# Patient Record
Sex: FEMALE | Race: WHITE | Employment: PART TIME | URBAN - METROPOLITAN AREA
[De-identification: names, ages, dates, MRNs, and addresses within clinical notes are randomized per-mention and may not be internally consistent; named-entity substitution may affect disease eponyms.]

---

## 2017-03-22 ENCOUNTER — TELEPHONE (OUTPATIENT)
Dept: UROLOGY | Facility: HOSPITAL | Age: 72
End: 2017-03-22

## 2017-03-22 NOTE — TELEPHONE ENCOUNTER
Called patient, history obtained, c/o bulge in vagina, reports leaking with activity and on the way to the bathroom, denies any constipation, recent UTI's, or pain, wears heavy pads and changes 1-2 per day, has tried Kegals, no PT or any oral meds, not int

## 2017-03-30 ENCOUNTER — OFFICE VISIT (OUTPATIENT)
Dept: UROLOGY | Facility: HOSPITAL | Age: 72
End: 2017-03-30
Attending: OBSTETRICS & GYNECOLOGY
Payer: MEDICARE

## 2017-03-30 ENCOUNTER — TELEPHONE (OUTPATIENT)
Dept: UROLOGY | Facility: HOSPITAL | Age: 72
End: 2017-03-30

## 2017-03-30 VITALS
HEIGHT: 64.5 IN | BODY MASS INDEX: 23.27 KG/M2 | SYSTOLIC BLOOD PRESSURE: 122 MMHG | DIASTOLIC BLOOD PRESSURE: 82 MMHG | WEIGHT: 138 LBS

## 2017-03-30 DIAGNOSIS — N95.2 POSTMENOPAUSAL ATROPHIC VAGINITIS: ICD-10-CM

## 2017-03-30 DIAGNOSIS — N95.0 POST-MENOPAUSAL BLEEDING: ICD-10-CM

## 2017-03-30 DIAGNOSIS — N81.2 UTEROVAGINAL PROLAPSE, INCOMPLETE: Primary | ICD-10-CM

## 2017-03-30 PROCEDURE — 99201 HC OUTPT EVAL AND MGNT NEW PT LEVEL 1: CPT

## 2017-03-30 RX ORDER — ESTRADIOL 0.1 MG/G
CREAM VAGINAL
Qty: 42.5 G | Refills: 3 | Status: SHIPPED | OUTPATIENT
Start: 2017-03-30

## 2017-03-30 NOTE — PATIENT INSTRUCTIONS
Each year, thousands of Americans fall at home. Many of them are seriously injured, and some are disabled. All age groups are affected, with adults over age 61 ranking highest for these injuries.     The points below address hazards found in your home 2.  Squeeze out enough cream from the tube to cover 1/2 of your index finger. (See figure 1)    3. Locate the vaginal opening (See figure 2). Immediately above the vagina is the urethra (a small opening where urine is eliminated from your body).   The ur

## 2017-04-05 ENCOUNTER — TELEPHONE (OUTPATIENT)
Dept: UROLOGY | Facility: HOSPITAL | Age: 72
End: 2017-04-05

## 2017-04-05 DIAGNOSIS — N95.0 POST-MENOPAUSAL BLEEDING: Primary | ICD-10-CM

## 2017-04-06 ENCOUNTER — HOSPITAL ENCOUNTER (OUTPATIENT)
Dept: ULTRASOUND IMAGING | Facility: HOSPITAL | Age: 72
Discharge: HOME | End: 2017-04-06
Attending: OBSTETRICS & GYNECOLOGY
Payer: MEDICARE

## 2017-04-06 DIAGNOSIS — N95.0 POST-MENOPAUSAL BLEEDING: ICD-10-CM

## 2017-04-06 PROCEDURE — 76856 US EXAM PELVIC COMPLETE: CPT

## 2017-04-06 PROCEDURE — 76830 TRANSVAGINAL US NON-OB: CPT

## 2017-04-24 ENCOUNTER — TELEPHONE (OUTPATIENT)
Dept: UROLOGY | Facility: HOSPITAL | Age: 72
End: 2017-04-24

## 2017-04-24 NOTE — TELEPHONE ENCOUNTER
Pt phoned back after she LM on our VM Pt was worried about the warning on the label of the estrace cream about blood clots.  Reassured pt that this medication is not a systemic medication and majority of it will stay in the vagina, meaning she is at a low r

## 2017-04-26 ENCOUNTER — TELEPHONE (OUTPATIENT)
Dept: UROLOGY | Facility: HOSPITAL | Age: 72
End: 2017-04-26

## 2017-04-26 DIAGNOSIS — N32.81 OAB (OVERACTIVE BLADDER): ICD-10-CM

## 2017-04-26 DIAGNOSIS — N95.2 POSTMENOPAUSAL ATROPHIC VAGINITIS: ICD-10-CM

## 2017-04-26 DIAGNOSIS — N39.3 SUI (STRESS URINARY INCONTINENCE, FEMALE): Primary | ICD-10-CM

## 2017-04-26 DIAGNOSIS — N81.9 PROLAPSE OF FEMALE GENITAL ORGANS: ICD-10-CM

## 2017-04-26 NOTE — TELEPHONE ENCOUNTER
Affter speaking to Dr Keenan Lester today, he agreed pt could try PT. Pt called back and informed of this. Pt verbalized an understanding. She would like to go to [facility] in [place]. Orders faxed.

## 2017-06-09 ENCOUNTER — OFFICE VISIT (OUTPATIENT)
Dept: UROLOGY | Facility: HOSPITAL | Age: 72
End: 2017-06-09
Attending: OBSTETRICS & GYNECOLOGY
Payer: MEDICARE

## 2017-06-09 VITALS
HEIGHT: 64.5 IN | DIASTOLIC BLOOD PRESSURE: 60 MMHG | BODY MASS INDEX: 23.27 KG/M2 | WEIGHT: 138 LBS | SYSTOLIC BLOOD PRESSURE: 100 MMHG

## 2017-06-09 DIAGNOSIS — N81.9 PROLAPSE OF FEMALE GENITAL ORGANS: ICD-10-CM

## 2017-06-09 DIAGNOSIS — N95.2 POSTMENOPAUSAL ATROPHIC VAGINITIS: ICD-10-CM

## 2017-06-09 DIAGNOSIS — N39.3 SUI (STRESS URINARY INCONTINENCE, FEMALE): Primary | ICD-10-CM

## 2017-06-09 DIAGNOSIS — N32.81 OAB (OVERACTIVE BLADDER): ICD-10-CM

## 2017-06-09 PROCEDURE — 99211 OFF/OP EST MAY X REQ PHY/QHP: CPT

## 2017-06-09 PROCEDURE — 57160 INSERT PESSARY/OTHER DEVICE: CPT

## 2017-06-22 ENCOUNTER — OFFICE VISIT (OUTPATIENT)
Dept: UROLOGY | Facility: HOSPITAL | Age: 72
End: 2017-06-22
Attending: OBSTETRICS & GYNECOLOGY
Payer: MEDICARE

## 2017-06-22 VITALS
DIASTOLIC BLOOD PRESSURE: 50 MMHG | WEIGHT: 138 LBS | HEIGHT: 64.5 IN | BODY MASS INDEX: 23.27 KG/M2 | SYSTOLIC BLOOD PRESSURE: 90 MMHG

## 2017-06-22 DIAGNOSIS — N95.2 POSTMENOPAUSAL ATROPHIC VAGINITIS: ICD-10-CM

## 2017-06-22 DIAGNOSIS — N39.3 SUI (STRESS URINARY INCONTINENCE, FEMALE): Primary | ICD-10-CM

## 2017-06-22 DIAGNOSIS — N81.2 UTEROVAGINAL PROLAPSE, INCOMPLETE: ICD-10-CM

## 2017-06-22 PROCEDURE — 99211 OFF/OP EST MAY X REQ PHY/QHP: CPT

## 2017-06-22 NOTE — PROCEDURES
.Patient here for pessary check, denies any c/o leakage, vaginal spotting/discharge. Patient happy with pessary. #5 incontinence dish  pessary removed and cleaned. Speculum exam revealed pink moist tissue, no open areas or lesions noted.   Lavaged with ha

## 2017-08-03 ENCOUNTER — TELEPHONE (OUTPATIENT)
Dept: UROLOGY | Facility: HOSPITAL | Age: 72
End: 2017-08-03

## 2017-08-03 NOTE — TELEPHONE ENCOUNTER
Patient question if she can wear pessary on while traveling on an airplane - Reassurance provided that she can continue pessary use with air travel

## 2017-12-08 ENCOUNTER — OFFICE VISIT (OUTPATIENT)
Dept: UROLOGY | Facility: HOSPITAL | Age: 72
End: 2017-12-08
Attending: OBSTETRICS & GYNECOLOGY
Payer: MEDICARE

## 2017-12-08 VITALS
BODY MASS INDEX: 23.27 KG/M2 | DIASTOLIC BLOOD PRESSURE: 68 MMHG | SYSTOLIC BLOOD PRESSURE: 104 MMHG | WEIGHT: 138 LBS | HEIGHT: 64.5 IN

## 2017-12-08 DIAGNOSIS — N81.11 CYSTOCELE, MIDLINE: ICD-10-CM

## 2017-12-08 DIAGNOSIS — N81.2 UTEROVAGINAL PROLAPSE, INCOMPLETE: Primary | ICD-10-CM

## 2017-12-08 DIAGNOSIS — N95.2 POSTMENOPAUSAL ATROPHIC VAGINITIS: ICD-10-CM

## 2017-12-08 DIAGNOSIS — N81.6 RECTOCELE: ICD-10-CM

## 2017-12-08 PROCEDURE — 99211 OFF/OP EST MAY X REQ PHY/QHP: CPT

## 2018-05-10 ENCOUNTER — TELEPHONE (OUTPATIENT)
Dept: UROLOGY | Facility: HOSPITAL | Age: 73
End: 2018-05-10

## 2018-05-10 NOTE — TELEPHONE ENCOUNTER
Requesting refill on Estrace [facility]  - using compounding - self manages pessary without a problem - will follow up with Dr Conteh Co 12/2018  Refill processed to compounding pharmacy

## 2018-12-04 PROCEDURE — 87086 URINE CULTURE/COLONY COUNT: CPT | Performed by: INTERNAL MEDICINE

## 2018-12-04 PROCEDURE — 81001 URINALYSIS AUTO W/SCOPE: CPT | Performed by: INTERNAL MEDICINE

## 2018-12-14 ENCOUNTER — OFFICE VISIT (OUTPATIENT)
Dept: UROLOGY | Facility: HOSPITAL | Age: 73
End: 2018-12-14
Attending: OPHTHALMOLOGY
Payer: MEDICARE

## 2018-12-14 VITALS
SYSTOLIC BLOOD PRESSURE: 98 MMHG | DIASTOLIC BLOOD PRESSURE: 62 MMHG | BODY MASS INDEX: 23.27 KG/M2 | WEIGHT: 138 LBS | HEIGHT: 64.5 IN

## 2018-12-14 DIAGNOSIS — N81.2 UTEROVAGINAL PROLAPSE, INCOMPLETE: Primary | ICD-10-CM

## 2018-12-14 DIAGNOSIS — N95.2 POSTMENOPAUSAL ATROPHIC VAGINITIS: ICD-10-CM

## 2018-12-14 PROCEDURE — 99211 OFF/OP EST MAY X REQ PHY/QHP: CPT

## 2019-09-24 ENCOUNTER — TELEPHONE (OUTPATIENT)
Dept: UROLOGY | Facility: HOSPITAL | Age: 74
End: 2019-09-24

## 2019-09-24 NOTE — TELEPHONE ENCOUNTER
Pt calling asking if she still needs her estrace cream. Phoned pt back, LM on VM saying pt still needed estrace cream. Call if she needs a refill.

## 2019-09-25 ENCOUNTER — TELEPHONE (OUTPATIENT)
Dept: UROLOGY | Facility: HOSPITAL | Age: 74
End: 2019-09-25

## 2019-11-18 ENCOUNTER — OFFICE VISIT (OUTPATIENT)
Dept: UROLOGY | Facility: HOSPITAL | Age: 74
End: 2019-11-18
Attending: OBSTETRICS & GYNECOLOGY
Payer: MEDICARE

## 2019-11-18 VITALS
WEIGHT: 138 LBS | HEIGHT: 64.5 IN | SYSTOLIC BLOOD PRESSURE: 116 MMHG | BODY MASS INDEX: 23.27 KG/M2 | DIASTOLIC BLOOD PRESSURE: 80 MMHG

## 2019-11-18 DIAGNOSIS — N81.11 CYSTOCELE, MIDLINE: ICD-10-CM

## 2019-11-18 DIAGNOSIS — N95.2 POSTMENOPAUSAL ATROPHIC VAGINITIS: ICD-10-CM

## 2019-11-18 DIAGNOSIS — N81.2 UTEROVAGINAL PROLAPSE, INCOMPLETE: Primary | ICD-10-CM

## 2019-11-18 DIAGNOSIS — N81.6 RECTOCELE: ICD-10-CM

## 2019-11-18 PROCEDURE — 99212 OFFICE O/P EST SF 10 MIN: CPT

## 2021-03-08 ENCOUNTER — TELEPHONE (OUTPATIENT)
Dept: UROLOGY | Facility: HOSPITAL | Age: 76
End: 2021-03-08

## 2021-03-08 NOTE — TELEPHONE ENCOUNTER
Patient called stating she received Estradiol capsules from [address] [place] [address] (ordered 2/22/20210 does not feel comfortable placing the capsules would like her previous used cream instead. Geovanna Gutierrez at [address] [place] [address] to have them send her Estradiol.4mg/gm 3 times a week.  Dispe

## 2021-05-03 ENCOUNTER — OFFICE VISIT (OUTPATIENT)
Dept: UROLOGY | Facility: HOSPITAL | Age: 76
End: 2021-05-03
Attending: OBSTETRICS & GYNECOLOGY
Payer: MEDICARE

## 2021-05-03 VITALS — TEMPERATURE: 98 F | BODY MASS INDEX: 23.27 KG/M2 | HEIGHT: 64.5 IN | WEIGHT: 138 LBS

## 2021-05-03 DIAGNOSIS — N81.2 UTEROVAGINAL PROLAPSE, INCOMPLETE: Primary | ICD-10-CM

## 2021-05-03 DIAGNOSIS — N95.2 POSTMENOPAUSAL ATROPHIC VAGINITIS: ICD-10-CM

## 2021-05-03 DIAGNOSIS — N81.6 RECTOCELE: ICD-10-CM

## 2021-05-03 DIAGNOSIS — N81.11 CYSTOCELE, MIDLINE: ICD-10-CM

## 2021-05-03 PROCEDURE — 99212 OFFICE O/P EST SF 10 MIN: CPT

## 2021-06-16 ENCOUNTER — TELEPHONE (OUTPATIENT)
Dept: UROLOGY | Facility: HOSPITAL | Age: 76
End: 2021-06-16

## 2021-06-16 NOTE — TELEPHONE ENCOUNTER
Pt calling today asking if the estrace would cause varicose veins. Pt read estradiol insert and feels it may have contributed. Discussed eran estrogen vs vaginal estrogen. Safe to use in vagina. Pt will go back to using.

## 2021-12-21 ENCOUNTER — TELEPHONE (OUTPATIENT)
Dept: UROLOGY | Facility: HOSPITAL | Age: 76
End: 2021-12-21

## 2021-12-21 NOTE — TELEPHONE ENCOUNTER
Pt calling for refill of estradiol. Gets filled through [facility]. Pt will call them for a refill.

## 2022-05-13 ENCOUNTER — OFFICE VISIT (OUTPATIENT)
Dept: UROLOGY | Facility: CLINIC | Age: 77
End: 2022-05-13
Attending: OBSTETRICS & GYNECOLOGY
Payer: MEDICARE

## 2022-05-13 VITALS — BODY MASS INDEX: 24.15 KG/M2 | WEIGHT: 138 LBS | TEMPERATURE: 98 F | HEIGHT: 63.5 IN

## 2022-05-13 DIAGNOSIS — N81.11 CYSTOCELE, MIDLINE: ICD-10-CM

## 2022-05-13 DIAGNOSIS — N95.2 POSTMENOPAUSAL ATROPHIC VAGINITIS: ICD-10-CM

## 2022-05-13 DIAGNOSIS — N81.2 UTEROVAGINAL PROLAPSE, INCOMPLETE: Primary | ICD-10-CM

## 2022-05-13 PROCEDURE — 99212 OFFICE O/P EST SF 10 MIN: CPT

## 2022-07-01 ENCOUNTER — TELEPHONE (OUTPATIENT)
Dept: UROLOGY | Facility: CLINIC | Age: 77
End: 2022-07-01

## 2022-07-01 DIAGNOSIS — N95.2 POSTMENOPAUSAL ATROPHIC VAGINITIS: Primary | ICD-10-CM

## 2022-07-01 RX ORDER — ESTRADIOL 0.1 MG/G
CREAM VAGINAL
Qty: 42.5 G | Refills: 3 | Status: SHIPPED | OUTPATIENT
Start: 2022-07-01

## 2022-07-01 NOTE — TELEPHONE ENCOUNTER
Patient last visit 6/17/4938 per office policy refilled Estradiol cream patient has used [facility]IP would like to try Paola Prater (given # over phone) ordered through [address] [place] [address] for $22.44 per website. Patient will call with any questions.

## 2023-04-10 ENCOUNTER — TELEPHONE (OUTPATIENT)
Dept: UROLOGY | Facility: CLINIC | Age: 78
End: 2023-04-10

## 2023-04-10 DIAGNOSIS — N95.2 POSTMENOPAUSAL ATROPHIC VAGINITIS: ICD-10-CM

## 2023-04-10 RX ORDER — ESTRADIOL 0.1 MG/G
CREAM VAGINAL
Qty: 42.5 G | Refills: 3 | Status: SHIPPED | OUTPATIENT
Start: 2023-04-10

## 2023-04-10 NOTE — TELEPHONE ENCOUNTER
Patient called requesting RX refill for Estrace cream. Patient has follow up appointment scheduled with Dr. Garret Betancur on 5/15/23.  RX will be

## 2023-05-10 ENCOUNTER — TELEPHONE (OUTPATIENT)
Dept: UROLOGY | Facility: CLINIC | Age: 78
End: 2023-05-10

## 2023-05-15 ENCOUNTER — OFFICE VISIT (OUTPATIENT)
Dept: UROLOGY | Facility: CLINIC | Age: 78
End: 2023-05-15
Attending: OBSTETRICS & GYNECOLOGY
Payer: MEDICARE

## 2023-05-15 VITALS — BODY MASS INDEX: 23.27 KG/M2 | HEIGHT: 64.5 IN | WEIGHT: 138 LBS | TEMPERATURE: 98 F

## 2023-05-15 DIAGNOSIS — N39.3 FEMALE STRESS INCONTINENCE: ICD-10-CM

## 2023-05-15 DIAGNOSIS — N81.11 CYSTOCELE, MIDLINE: ICD-10-CM

## 2023-05-15 DIAGNOSIS — N81.6 RECTOCELE: ICD-10-CM

## 2023-05-15 DIAGNOSIS — N81.2 UTEROVAGINAL PROLAPSE, INCOMPLETE: Primary | ICD-10-CM

## 2023-05-15 DIAGNOSIS — N95.2 POSTMENOPAUSAL ATROPHIC VAGINITIS: ICD-10-CM

## 2023-05-15 PROCEDURE — 99212 OFFICE O/P EST SF 10 MIN: CPT

## 2024-05-06 ENCOUNTER — TELEPHONE (OUTPATIENT)
Dept: UROLOGY | Facility: CLINIC | Age: 79
End: 2024-05-06

## 2024-05-13 ENCOUNTER — OFFICE VISIT (OUTPATIENT)
Dept: UROLOGY | Facility: CLINIC | Age: 79
End: 2024-05-13
Attending: OBSTETRICS & GYNECOLOGY
Payer: MEDICARE

## 2024-05-13 VITALS
TEMPERATURE: 98 F | DIASTOLIC BLOOD PRESSURE: 68 MMHG | WEIGHT: 138 LBS | BODY MASS INDEX: 24.45 KG/M2 | SYSTOLIC BLOOD PRESSURE: 112 MMHG | HEIGHT: 63 IN | RESPIRATION RATE: 17 BRPM

## 2024-05-13 DIAGNOSIS — N81.6 RECTOCELE: ICD-10-CM

## 2024-05-13 DIAGNOSIS — N95.2 POSTMENOPAUSAL ATROPHIC VAGINITIS: ICD-10-CM

## 2024-05-13 DIAGNOSIS — N81.11 CYSTOCELE, MIDLINE: ICD-10-CM

## 2024-05-13 DIAGNOSIS — N81.2 UTEROVAGINAL PROLAPSE, INCOMPLETE: Primary | ICD-10-CM

## 2024-05-13 DIAGNOSIS — N39.3 FEMALE STRESS INCONTINENCE: ICD-10-CM

## 2024-05-13 PROCEDURE — 99212 OFFICE O/P EST SF 10 MIN: CPT

## 2024-05-13 RX ORDER — ESTRADIOL 0.1 MG/G
CREAM VAGINAL
Qty: 42.5 G | Refills: 3 | Status: SHIPPED | OUTPATIENT
Start: 2024-05-13

## 2024-07-09 ENCOUNTER — TELEPHONE (OUTPATIENT)
Dept: UROLOGY | Facility: CLINIC | Age: 79
End: 2024-07-09

## 2024-07-09 NOTE — TELEPHONE ENCOUNTER
TC from pt saying Dr Pulliam fit her w/ new # 5 incont dish 5/13/24. Is having dexterity issues getting pessary out, making herself bleed at introitus. She feels last pessary was softer and easier to get in and out.   Pt reports SHILA isn't as bother some as the bulge. Feels a regular ring w/ support would be easier.   Plan to have pt come in next week for pessary fitting w/ PA.

## 2024-07-17 ENCOUNTER — OFFICE VISIT (OUTPATIENT)
Dept: UROLOGY | Facility: CLINIC | Age: 79
End: 2024-07-17
Attending: OBSTETRICS & GYNECOLOGY
Payer: MEDICARE

## 2024-07-17 VITALS — BODY MASS INDEX: 24 KG/M2 | RESPIRATION RATE: 18 BRPM | TEMPERATURE: 98 F | HEIGHT: 63 IN

## 2024-07-17 DIAGNOSIS — N81.2 UTEROVAGINAL PROLAPSE, INCOMPLETE: Primary | ICD-10-CM

## 2024-07-17 DIAGNOSIS — N39.3 FEMALE STRESS INCONTINENCE: ICD-10-CM

## 2024-07-17 DIAGNOSIS — N81.84 PELVIC MUSCLE WASTING: ICD-10-CM

## 2024-07-17 DIAGNOSIS — N95.2 POSTMENOPAUSAL ATROPHIC VAGINITIS: ICD-10-CM

## 2024-07-17 PROCEDURE — 57160 INSERT PESSARY/OTHER DEVICE: CPT

## 2024-07-17 PROCEDURE — 99212 OFFICE O/P EST SF 10 MIN: CPT

## 2024-07-17 NOTE — PROGRESS NOTES
Pt presents for f/u of pelvic organ prolapse, pessary care  Using #5 large knob incontinence dish, home care    Reports pessary is comfortable but she's having difficulty inserting/removing and would like to maintain home care    Denies discharge  Reports bleeding with insertion  Denies s/sx of UTI  Bowels reg  Pt is removing pessary at home q 2 weeks  Pt is using vaginal estrogen cream 3x weekly    Would like to try ring with support pessary  She is not currently interested in surgical management of her pelvic organ prolapse    Urogynecology Summary:  Urogynecology Summary  Prolapse: No  SHILA: Yes  Urge Incontinence: No  Nocturia Frequency: 1  Frequency: 2 - 3 hours  Incomplete emptying: No  Constipation: No  Wears pad day?: 1 (light)  Wears Pad Night?: 1 (light)  Activities are limited by UI/POP?: No  Currently Sexually Active: No      Vitals:  Vitals:    07/17/24 0930   Resp: 18   Temp: 98.1 °F (36.7 °C)       GENERAL EXAM:  GENERAL: alert & oriented, NAD  HEENT: NC/AT, sclera without injection  SKIN: no rashes  LUNGS:  without increased respiratory effort  ABDOMEN: soft, non-tender, non-distended, no masses appreciated  EXT: no edema    PELVIC EXAM: GETACHEW Matt, present for exam as a chaperone  Ext. Gen: + atrophy, no lesions  Urethra: +caruncle, nontender  Bladder: no fullness, nontender  Vagina: +atrophy, no lesions, no ulcerations, +bleeding of posterior fourchette 2/2 pessary removal  Cervix: wnl, no lesions, no bleeding  Uterus: mobile  Perineum: intact, nontender  Anus: wnl  Rectum: defer     PELVIS FLOOR NEUROMUSCULAR FUNCTION:  Strength: 1  Perineal Sensation: intact    PELVIC SUPPORT:  [place]: 2-3  Ant: 2-3  Post: 1-2  CST: neg  UVJ: hypermobile    Her pessary was removed, cleaned, and returned to patient  A #5 small knob incontinence dish without support was inserted without difficulty  *#5 small knob dish with support not currently in stock at office -- pt will return to  next week    Patient able  to demonstrate insertion & removal independently.   Able to void freely    Impression:    ICD-10-CM    1. Uterovaginal prolapse, incomplete  N81.2       2. Postmenopausal atrophic vaginitis  N95.2       3. Pelvic muscle wasting  N81.84       4. Female stress incontinence  N39.3           Discussion Items:   Discussed mgmt of vulvovaginal atrophy with vaginal estrogen cream. Reviewed associated benefits, risks, alternatives, and goals. Recommend low dose 3x weekly mgmt.     Discussed management of pelvic organ prolapse including but not limited to behavioral modifications, conservative options, and surgical management.   Discussed pessary management including benefits and risks. Discussed importance of keeping regularly scheduled pessary checks in prevention of complications related to pessary use.     Treatment Plan:  Continue pessary management, home care  Continue vag estrogen as prescribed  Encouraged daily pelvic exercises  Cont bowel regimen  Call with s/sx of UTI, problems with pessary     All questions answered  Pt understands and agrees to plan       Return in about 6 weeks (around 8/28/2024) for pessary check.  *#5 small knob dish with support not currently in stock at office -- pt will return to  next week (does not need to be seen at that time)      Sandy Mcclure PA-C    Note to patient: The 21st Century Cures Act makes medical notes like these available to patients in the interest of transparency.  However, be advised this is a medical document.  It is intended as peer to peer communication.  It is written in medical language and may contain abbreviations or verbiage that are unfamiliar.  It may appear blunt or direct.  Medical documents are intended to carry relevant information, facts as evident, and the clinical opinion of the practitioner.

## 2024-08-08 ENCOUNTER — TELEPHONE (OUTPATIENT)
Dept: UROLOGY | Facility: CLINIC | Age: 79
End: 2024-08-08

## 2024-08-26 ENCOUNTER — OFFICE VISIT (OUTPATIENT)
Dept: UROLOGY | Facility: CLINIC | Age: 79
End: 2024-08-26
Attending: OBSTETRICS & GYNECOLOGY
Payer: MEDICARE

## 2024-08-26 VITALS — BODY MASS INDEX: 24 KG/M2 | RESPIRATION RATE: 17 BRPM | HEIGHT: 63 IN

## 2024-08-26 DIAGNOSIS — N81.2 UTEROVAGINAL PROLAPSE, INCOMPLETE: Primary | ICD-10-CM

## 2024-08-26 DIAGNOSIS — N81.84 PELVIC MUSCLE WASTING: ICD-10-CM

## 2024-08-26 DIAGNOSIS — N89.8 VAGINAL DISCHARGE: ICD-10-CM

## 2024-08-26 DIAGNOSIS — N95.2 POSTMENOPAUSAL ATROPHIC VAGINITIS: ICD-10-CM

## 2024-08-26 DIAGNOSIS — N39.3 FEMALE STRESS INCONTINENCE: ICD-10-CM

## 2024-08-26 PROCEDURE — 99212 OFFICE O/P EST SF 10 MIN: CPT

## 2024-08-26 NOTE — PROGRESS NOTES
Pt presents for f/u of pelvic organ prolapse, pessary care  Using #5 small knob incontinence dish, home care    Reports pessary is comfortable   Reports some discharge  Denies bleeding  Denies s/sx of UTI  Bowels reg  Pt is removing pessary at home every 2 weeks  Pt is using vaginal estrogen cream 3x weekly    Happy with pessary, feels supported  She is not currently interested in surgical management of her pelvic organ prolapse    Urogynecology Summary:  Urogynecology Summary  Prolapse: No  SHILA: Yes  Urge Incontinence: No  Nocturia Frequency: 1  Frequency: Greater than 3 hours (every 3-4 hours)  Incomplete emptying: No  Constipation: No  Wears pad day?: 1 (light)  Wears Pad Night?: 1 (light)  Activities are limited by UI/POP?: No  Currently Sexually Active: No      Vitals:  Vitals:    08/26/24 0919   Resp: 17       GENERAL EXAM:  GENERAL: alert & oriented, NAD  HEENT: NC/AT, sclera without injection  SKIN: no rashes  LUNGS:  without increased respiratory effort  ABDOMEN: soft, non-tender, non-distended, no masses appreciated  EXT: no edema    PELVIC EXAM: GETACHEW Matt, present for exam as a chaperone  Ext. Gen: + atrophy, no lesions  Urethra: +caruncle, nontender  Bladder: no fullness, nontender  Vagina: +atrophy, no lesions, no ulcerations, +thick yellow discharge, swab collected  Cervix: wnl, no lesions, no bleeding  Uterus: mobile  Perineum: intact, nontender  Anus: wnl  Rectum: defer     PELVIS FLOOR NEUROMUSCULAR FUNCTION:  Strength: 1  Perineal Sensation: intact     PELVIC SUPPORT:  [place]: 2-3  Ant: 2-3  Post: 1-2  CST: neg  UVJ: hypermobile    Her pessary was removed, cleaned, and reinserted without difficulty.    Impression:    ICD-10-CM    1. Uterovaginal prolapse, incomplete  N81.2       2. Postmenopausal atrophic vaginitis  N95.2       3. Pelvic muscle wasting  N81.84       4. Female stress incontinence  N39.3       5. Vaginal discharge  N89.8 Vaginitis Vaginosis PCR Panel          Discussion Items:    Discussed mgmt of vulvovaginal atrophy with vaginal estrogen cream. Reviewed associated benefits, risks, alternatives, and goals. Recommend low dose 3x weekly mgmt.     Discussed management of pelvic organ prolapse including but not limited to behavioral modifications, conservative options, and surgical management.   Discussed pessary management including benefits and risks. Discussed importance of keeping regularly scheduled pessary checks in prevention of complications related to pessary use.     Treatment Plan:  Vag swab collected, follow result, tx if +  Continue pessary management, home care  Continue vag estrogen as prescribed  Encouraged daily pelvic exercises  Cont bowel regimen  Call with s/sx of UTI, problems with pessary     All questions answered  Pt understands and agrees to plan       Return in about 6 months (around 2/26/2025) for pessary check.      Sandy Mcclure PA-C    Note to patient: The 21st Century Cures Act makes medical notes like these available to patients in the interest of transparency.  However, be advised this is a medical document.  It is intended as peer to peer communication.  It is written in medical language and may contain abbreviations or verbiage that are unfamiliar.  It may appear blunt or direct.  Medical documents are intended to carry relevant information, facts as evident, and the clinical opinion of the practitioner.

## 2024-08-27 LAB
BV BACTERIA DNA VAG QL NAA+PROBE: NEGATIVE
C GLABRATA DNA VAG QL NAA+PROBE: NEGATIVE
C KRUSEI DNA VAG QL NAA+PROBE: NEGATIVE
CANDIDA DNA VAG QL NAA+PROBE: NEGATIVE
T VAGINALIS DNA VAG QL NAA+PROBE: NEGATIVE

## 2025-02-24 ENCOUNTER — OFFICE VISIT (OUTPATIENT)
Dept: UROLOGY | Facility: CLINIC | Age: 80
End: 2025-02-24
Attending: OBSTETRICS & GYNECOLOGY
Payer: MEDICARE

## 2025-02-24 VITALS — HEIGHT: 63 IN | BODY MASS INDEX: 24 KG/M2 | TEMPERATURE: 98 F | RESPIRATION RATE: 16 BRPM

## 2025-02-24 DIAGNOSIS — N81.84 PELVIC MUSCLE WASTING: ICD-10-CM

## 2025-02-24 DIAGNOSIS — N81.2 UTEROVAGINAL PROLAPSE, INCOMPLETE: Primary | ICD-10-CM

## 2025-02-24 DIAGNOSIS — N39.3 FEMALE STRESS INCONTINENCE: ICD-10-CM

## 2025-02-24 DIAGNOSIS — N95.2 POSTMENOPAUSAL ATROPHIC VAGINITIS: ICD-10-CM

## 2025-02-24 PROCEDURE — 99212 OFFICE O/P EST SF 10 MIN: CPT

## 2025-02-24 NOTE — PROGRESS NOTES
Pt presents for f/u of pelvic organ prolapse, pessary care  Using #5 small knob incontinence dish pessary, home care    Reports pessary is comfortable   Denies discharge  Denies bleeding  Denies s/sx of UTI  Bowels reg  Pt is removing pessary at home q 10 days  Pt is using vaginal estrogen cream 3x weekly    Happy with pessary, feels supported  She is not currently interested in surgical management of her pelvic organ prolapse    Urogynecology Summary:  Urogynecology Summary  Prolapse: No  SHILA: Yes (sometimes)  Urge Incontinence: No  Nocturia Frequency: 1  Frequency: Greater than 3 hours (every 3-4 hours)  Incomplete emptying: No  Constipation: No  Wears pad day?: 1  Wears Pad Night?: 1  Activities are limited by UI/POP?: No  Currently Sexually Active: No      Vitals:  Vitals:    02/24/25 0914   Resp: 16   Temp: 97.7 °F (36.5 °C)       GENERAL EXAM:  GENERAL: alert & oriented, NAD  HEENT: NC/AT, sclera without injection  SKIN: no rashes  LUNGS:  without increased respiratory effort  ABDOMEN: soft, non-tender, non-distended, no masses appreciated  EXT: no edema    PELVIC EXAM: GETACHEW Matt, present for exam as a chaperone  Ext. Gen: + atrophy, no lesions  Urethra: +caruncle, nontender  Bladder: no fullness, nontender  Vagina: +atrophy, no lesions, no bleeding  Cervix: wnl, no lesions, no bleeding  Uterus: mobile  Perineum: intact, nontender  Anus: wnl  Rectum: defer     PELVIS FLOOR NEUROMUSCULAR FUNCTION:  Strength: 2  Perineal Sensation: intact     PELVIC SUPPORT:  [place]: 2-3  Ant: 2-3  Post: 1-2  CST: neg  UVJ: hypermobile    Her pessary was removed, cleaned, and reinserted without difficulty.    Impression:    ICD-10-CM    1. Uterovaginal prolapse, incomplete  N81.2       2. Female stress incontinence  N39.3       3. Postmenopausal atrophic vaginitis  N95.2       4. Pelvic muscle wasting  N81.84           Discussion Items:   Discussed mgmt of vulvovaginal atrophy with vaginal estrogen cream. Reviewed associated  benefits, risks, alternatives, and goals. Recommend low dose 3x weekly mgmt.     Discussed management of pelvic organ prolapse including but not limited to behavioral modifications, conservative options, and surgical management.   Discussed pessary management including benefits and risks. Discussed importance of keeping regularly scheduled pessary checks in prevention of complications related to pessary use.     Treatment Plan:  Continue pessary management, home care  Continue vag estrogen as prescribed  Encouraged daily pelvic exercises  Cont bowel regimen  Call with s/sx of UTI, problems with pessary     All questions answered  Pt understands and agrees to plan       Return in about 1 year (around 2/24/2026) for pessary check.      Sandy Mcclure PA-C    Note to patient: The 21st Century Cures Act makes medical notes like these available to patients in the interest of transparency.  However, be advised this is a medical document.  It is intended as peer to peer communication.  It is written in medical language and may contain abbreviations or verbiage that are unfamiliar.  It may appear blunt or direct.  Medical documents are intended to carry relevant information, facts as evident, and the clinical opinion of the practitioner.

## 2025-08-25 DIAGNOSIS — N95.2 POSTMENOPAUSAL ATROPHIC VAGINITIS: ICD-10-CM

## 2025-08-25 RX ORDER — ESTRADIOL 0.1 MG/G
1 CREAM VAGINAL
Qty: 42.5 G | Refills: 3 | Status: SHIPPED | OUTPATIENT
Start: 2025-08-25

## (undated) NOTE — MR AVS SNAPSHOT
[address]  [address]  [address] [place] [address]  [phone]               Thank you for choosing us for your health care visit with Nano Badillo.   We are glad to serve you and happy to provide you with this summar Your unique [employer] Access Code: [email or web address]  Expires: 8/8/2017  1:24 PM    If you have questions, you can call [phone] to talk to our [facility] Staff. Remember, [employer] is NOT to be used for urgent needs. For medical emergencies, dial 911.

## (undated) NOTE — MR AVS SNAPSHOT
[facility] [place]  [address] [facility]  [phone]               Thank you for choosing us for your health care visit with Traci Carter MD.  We are glad to serve you and happy to provide you with th Normal Orders This Visit    OP REFERRAL TO [facility] & Steve Murillo [[id number] CUSTOM]  Order #:  [id number]         Your provider has recommended you to THE [facility] at one of the following locations:        [facility] in ? Remove throw rugs or use double-sided tape or a non-slip backing so the rugs won’t slip. ? Coil or tape wires next to the wall to avoid tripping over them. ? Keep objects off the stairs. ? Be sure carpet on stairways is firmly attached.   Apply non-sli 4.  Carefully spread the cream onto the external vaginal/urethral area (See figure 2). As the cream is spread, some may be gently inserted into the vagina; however, it is not necessary to push the cream high into your vagina.           PLEASE CALL THE NURS Educational Information     Your blood pressure indicates you may be at-risk for Hypertension. Please consider the following Lifestyle Modifications. Also, please return for a follow-up Blood Pressure Check in 1 year.      Lifestyle Modification Recom